# Patient Record
Sex: FEMALE
[De-identification: names, ages, dates, MRNs, and addresses within clinical notes are randomized per-mention and may not be internally consistent; named-entity substitution may affect disease eponyms.]

---

## 2019-12-02 ENCOUNTER — RX ONLY (RX ONLY)
Age: 24
End: 2019-12-02

## 2019-12-02 ENCOUNTER — ACNE/ROSACEA (OUTPATIENT)
Dept: URBAN - METROPOLITAN AREA CLINIC 32 | Facility: CLINIC | Age: 24
Setting detail: DERMATOLOGY
End: 2019-12-02

## 2019-12-02 DIAGNOSIS — D48.5 NEOPLASM OF UNCERTAIN BEHAVIOR OF SKIN: ICD-10-CM

## 2019-12-02 PROBLEM — L71.0 PERIORAL DERMATITIS: Status: RESOLVED | Noted: 2019-12-02

## 2019-12-02 PROCEDURE — 99202 OFFICE O/P NEW SF 15 MIN: CPT

## 2020-02-06 ENCOUNTER — ACNE/ROSACEA (OUTPATIENT)
Dept: URBAN - METROPOLITAN AREA CLINIC 32 | Facility: CLINIC | Age: 25
Setting detail: DERMATOLOGY
End: 2020-02-06

## 2020-02-06 ENCOUNTER — RX ONLY (RX ONLY)
Age: 25
End: 2020-02-06

## 2020-02-06 DIAGNOSIS — Z41.9 ENCOUNTER FOR PROCEDURE FOR PURPOSES OTHER THAN REMEDYING HEALTH STATE, UNSPECIFIED: ICD-10-CM

## 2020-02-06 PROCEDURE — 99213 OFFICE O/P EST LOW 20 MIN: CPT

## 2020-02-06 RX ORDER — TRETINOIN 0.25 MG/G
1 APPLICATION CREAM TOPICAL NIGHTLY
Qty: 45 | Refills: 4
Start: 2020-02-06

## 2020-02-06 RX ORDER — SPIRONOLACTONE 50 MG/1
1 TABLET TABLET, FILM COATED ORAL TID
Qty: 270 | Refills: 2
Start: 2020-02-06

## 2020-03-27 ENCOUNTER — RX ONLY (RX ONLY)
Age: 25
End: 2020-03-27

## 2020-03-27 ENCOUNTER — FOLLOW UP (OUTPATIENT)
Dept: URBAN - METROPOLITAN AREA CLINIC 32 | Facility: CLINIC | Age: 25
Setting detail: DERMATOLOGY
End: 2020-03-27

## 2020-03-27 DIAGNOSIS — L72.3 SEBACEOUS CYST: ICD-10-CM

## 2020-03-27 PROCEDURE — 99213 OFFICE O/P EST LOW 20 MIN: CPT

## 2020-03-27 RX ORDER — TRETINOIN 0.5 MG/G
1 APPLICATION CREAM TOPICAL NIGHTLY
Qty: 45 | Refills: 11
Start: 2020-03-27

## 2020-03-27 RX ORDER — DOXYCYCLINE 100 MG/1
1 TABLET TABLET, FILM COATED ORAL BID
Qty: 60 | Refills: 0 | Status: DISCONTINUED
Start: 2020-03-27 | End: 2020-03-27

## 2020-03-27 RX ORDER — SPIRONOLACTONE 100 MG/1
1 TABLET TABLET, FILM COATED ORAL BID
Qty: 60 | Refills: 3
Start: 2020-03-27

## 2020-03-27 RX ORDER — SPIRONOLACTONE 100 MG/1
1 TABLET TABLET, FILM COATED ORAL BID
Qty: 60 | Refills: 3 | Status: DISCONTINUED
Start: 2020-03-27 | End: 2020-03-27

## 2020-03-27 RX ORDER — DOXYCYCLINE HYCLATE 20 MG/1
1 TABLET TABLET, FILM COATED ORAL BID
Qty: 60 | Refills: 3
Start: 2020-03-27

## 2020-03-27 RX ORDER — DOXYCYCLINE HYCLATE 20 MG/1
1 TABLET TABLET, FILM COATED ORAL BID
Qty: 60 | Refills: 3 | Status: DISCONTINUED
Start: 2020-03-27 | End: 2020-03-27

## 2020-03-27 RX ORDER — DOXYCYCLINE 100 MG/1
1 TABLET TABLET, FILM COATED ORAL BID
Qty: 60 | Refills: 0
Start: 2020-03-27

## 2020-03-27 RX ORDER — TRETINOIN 0.5 MG/G
1 APPLICATION CREAM TOPICAL NIGHTLY
Qty: 45 | Refills: 11 | Status: DISCONTINUED
Start: 2020-03-27 | End: 2020-03-27

## 2020-05-05 ENCOUNTER — RX ONLY (RX ONLY)
Age: 25
End: 2020-05-05

## 2020-05-05 RX ORDER — TRETINOIN 1 MG/G
1 APPLICATION CREAM TOPICAL NIGHTLY
Qty: 45 | Refills: 11
Start: 2020-05-05

## 2021-01-28 ENCOUNTER — OFFICE VISIT (OUTPATIENT)
Dept: URBAN - METROPOLITAN AREA CLINIC 98 | Facility: CLINIC | Age: 26
End: 2021-01-28
Payer: COMMERCIAL

## 2021-01-28 DIAGNOSIS — R11.0 NAUSEA: ICD-10-CM

## 2021-01-28 DIAGNOSIS — R13.10 DYSPHAGIA: ICD-10-CM

## 2021-01-28 PROCEDURE — G8420 CALC BMI NORM PARAMETERS: HCPCS | Performed by: INTERNAL MEDICINE

## 2021-01-28 PROCEDURE — G8427 DOCREV CUR MEDS BY ELIG CLIN: HCPCS | Performed by: INTERNAL MEDICINE

## 2021-01-28 PROCEDURE — 1036F TOBACCO NON-USER: CPT | Performed by: INTERNAL MEDICINE

## 2021-01-28 PROCEDURE — G8483 FLU IMM NO ADMIN DOC REA: HCPCS | Performed by: INTERNAL MEDICINE

## 2021-01-28 PROCEDURE — 99214 OFFICE O/P EST MOD 30 MIN: CPT | Performed by: INTERNAL MEDICINE

## 2021-01-28 RX ORDER — ONDANSETRON HYDROCHLORIDE 4 MG/1
1 TABLET TABLET, FILM COATED ORAL
Qty: 10 | Refills: 0 | OUTPATIENT
Start: 2021-01-28

## 2021-01-28 RX ORDER — OMEPRAZOLE 20 MG/1
1 CAPSULE 30 MINUTES BEFORE MORNING MEAL CAPSULE, DELAYED RELEASE ORAL ONCE A DAY
Qty: 30 | Refills: 0 | OUTPATIENT
Start: 2021-01-28

## 2021-01-28 NOTE — EXAM-AA
Constitutional: well-developed, normal communication ability.   Eyes: Conjunctivae and eyelids appear normal, no scleral icterus. Respiratory: lungs clear to auscultation bilaterally, no rales or wheezing, no crackles   Cardiovascular: No edema, no murmurs or gallops appreciated.   Gastrointestinal: No scars, normoactive bowel sounds, soft, slight RLQ tenderness, no rebound tenderness, no shifting dullness, no organomegaly.   Musculoskeletal: normal gait and station   Skin: no jaundice   Neurologic: Oriented to person, place, time. Short term memory intact.    Psychiatric: Normal mood and appropriate affect.

## 2021-01-28 NOTE — HPI-TODAY'S VISIT:
Ms. Garibay is a 26 yo F with gastroparesis presenting for follow up. She was on reglan 5 mg BID but she had jitteriness and tremor and then she went down to reglan 5 mg PO once daily. She ended up feeling better even off the medication and she stopped it. She stopped it in June 2019. Her symptoms initially were nausea all the time. 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 She started losing weight. She had a GES which was positive. They thought that was due to post-viral causes. She also has a history of diverticulitis in March 2019 and she has not had a colonoscopy.    In the last 1 week she has started to have nausea without vomiting. She feels like something is stuck in her throat (a lump). She has no pain. She feels it intermittently and it is mostly related to eating (30-45 minutes following the meal). She feels the need to clear her throat. She could feel food in her throat 3-4 hours after eating. She also thinks she still has acid regurgitation after meals. She has tried Tums and Pepto Bismol for her symptoms. She feels they are somewhat helpful.   She has no blood in the stool, no vomiting. She has lost 3 lbs in the last 1 week.

## 2021-02-04 ENCOUNTER — OFFICE VISIT (OUTPATIENT)
Dept: URBAN - METROPOLITAN AREA CLINIC 50 | Facility: CLINIC | Age: 26
End: 2021-02-04

## 2021-02-26 ENCOUNTER — OFFICE VISIT (OUTPATIENT)
Dept: URBAN - METROPOLITAN AREA TELEHEALTH 2 | Facility: TELEHEALTH | Age: 26
End: 2021-02-26
Payer: COMMERCIAL

## 2021-02-26 VITALS — BODY MASS INDEX: 20.62 KG/M2 | HEIGHT: 60 IN | WEIGHT: 105 LBS

## 2021-02-26 DIAGNOSIS — R13.10 DYSPHAGIA: ICD-10-CM

## 2021-02-26 DIAGNOSIS — R11.0 NAUSEA: ICD-10-CM

## 2021-02-26 PROCEDURE — 99214 OFFICE O/P EST MOD 30 MIN: CPT | Performed by: INTERNAL MEDICINE

## 2021-02-26 RX ORDER — OMEPRAZOLE 20 MG/1
1 CAPSULE 30 MINUTES BEFORE MORNING MEAL CAPSULE, DELAYED RELEASE ORAL TWICE A DAY
Qty: 60 | Refills: 3 | OUTPATIENT
Start: 2021-02-26

## 2021-02-26 RX ORDER — ONDANSETRON HYDROCHLORIDE 4 MG/1
1 TABLET TABLET, FILM COATED ORAL
OUTPATIENT
Start: 2021-01-28

## 2021-02-26 RX ORDER — OMEPRAZOLE 20 MG/1
1 CAPSULE 30 MINUTES BEFORE MORNING MEAL CAPSULE, DELAYED RELEASE ORAL ONCE A DAY
Qty: 30 | Refills: 0 | Status: ACTIVE | COMMUNITY
Start: 2021-01-28

## 2021-02-26 RX ORDER — ONDANSETRON HYDROCHLORIDE 4 MG/1
1 TABLET TABLET, FILM COATED ORAL
Qty: 10 | Refills: 0 | Status: ON HOLD | COMMUNITY
Start: 2021-01-28

## 2021-02-26 RX ORDER — OMEPRAZOLE 20 MG/1
1 CAPSULE 30 MINUTES BEFORE MORNING MEAL CAPSULE, DELAYED RELEASE ORAL ONCE A DAY
OUTPATIENT
Start: 2021-01-28

## 2021-02-26 NOTE — HPI-TODAY'S VISIT:
Ms. Garibay is a 26 yo F with gastroparesis presenting for follow up. She was on omeprazole 20 mg PO BID and then started running out and went down to 20 mg once daily. She felt much better on the omeprazole. When off the omeprazole she had more nausea and lack of appetite. She had some dry heaving but did not actually vomit. Weigh tis stable between 103-107. She is avoiding alcohol as much as possible.  She has had minimal dysphagia symptoms in the last 1 month.  2021:She was on reglan 5 mg BID but she had jitteriness and tremor and then she went down to reglan 5 mg PO once daily. She ended up feeling better even off the medication and she stopped it. She stopped it in 2019. Her symptoms initially were nausea all the time. 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 She started losing weight. She had a GES which was positive. They thought that was due to post-viral causes. She also has a history of diverticulitis in 2019 and she has not had a colonoscopy.    In the last 1 week she has started to have nausea without vomiting. She feels like something is stuck in her throat (a lump). She has no pain. She feels it intermittently and it is mostly related to eating (30-45 minutes following the meal). She feels the need to clear her throat. She could feel food in her throat 3-4 hours after eating. She also thinks she still has acid regurgitation after meals. She has tried Tums and Pepto Bismol for her symptoms. She feels they are somewhat helpful.   She has no blood in the stool, no vomiting. She has lost 3 lbs in the last 1 week. Ms. Garibay is a 26 yo F with gastroparesis presenting for follow up. She was on reglan 5 mg BID but she had jitteriness and tremor and then she went down to reglan 5 mg PO once daily. She ended up feeling better even off the medication and she stopped it. She stopped it in 2019. Her symptoms initially were nausea all the time. 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 She started losing weight. She had a GES which was positive. They thought that was due to post-viral causes. She also has a history of diverticulitis in 2019 and she has not had a colonoscopy.    In the last 1 week she has started to have nausea without vomiting. She feels like something is stuck in her throat (a lump). She has no pain. She feels it intermittently and it is mostly related to eating (30-45 minutes following the meal). She feels the need to clear her throat. She could feel food in her throat 3-4 hours after eating. She also thinks she still has acid regurgitation after meals. She has tried Tums and Pepto Bismol for her symptoms. She feels they are somewhat helpful.   She has no blood in the stool, no vomiting. She has lost 3 lbs in the last 1 week.   Patient seen today via telehealth by agreement and consent of patient in light of current COVID-19 pandemic. I used video conferencing during the visit. The patient encounter is appropriate and reasonable under the circumstances given the patient's particular presentation at this time. The patient has been advised of the followin) the potential risks and limitations of this mode of treatment (including but not limited to the absence of in-person examination); 2) the right to refuse telehealth services at any point without affecting the right to future care; 3) the right to receive in-person services, included immediately after this consultation if an urgent need arises; 4) information, including identifiable images or information from this telehealth consult, will only be shared in accordance with HIPPA regulations. Any and all of the patient's and/or patient's family member's questions on this issue have been answered. The patient has verbally consented to be treated via telehealth services. The patient has also been advised to contact this office for worsening conditions or problems, and seek emergency medical treatment and/or call 911 if the patient deems either necessary.   More than half of the face-to-face time used for counseling and coordination of care.

## 2021-02-26 NOTE — EXAM-AA
Constitutional: well-developed, normal communication ability.   Eyes: Conjunctivae and eyelids appear normal, no scleral icterus.   Nose/nasopharynx, external nose: appear normal, normal appearance of lips.   Neck/thyroid: normal appearance   Chest: No visualized lesions or deformities.   Gastrointestinal: no tenderness on self palpation   Musculoskeletal: no visualized joint erythema or swelling   Skin: no rashes or jaundice   Neurologic: Oriented to person, place, time.    Psychiatric: Normal mood and appropriate affect.

## 2021-05-28 ENCOUNTER — DASHBOARD ENCOUNTERS (OUTPATIENT)
Age: 26
End: 2021-05-28

## 2021-05-28 ENCOUNTER — OFFICE VISIT (OUTPATIENT)
Dept: URBAN - METROPOLITAN AREA TELEHEALTH 2 | Facility: TELEHEALTH | Age: 26
End: 2021-05-28
Payer: COMMERCIAL

## 2021-05-28 VITALS — HEIGHT: 60 IN | BODY MASS INDEX: 21.4 KG/M2 | WEIGHT: 109 LBS | TEMPERATURE: 97.9 F

## 2021-05-28 DIAGNOSIS — R11.0 NAUSEA: ICD-10-CM

## 2021-05-28 DIAGNOSIS — R13.19 CERVICAL DYSPHAGIA: ICD-10-CM

## 2021-05-28 PROBLEM — 40739000 DYSPHAGIA: Status: ACTIVE | Noted: 2021-01-28

## 2021-05-28 PROCEDURE — 99214 OFFICE O/P EST MOD 30 MIN: CPT | Performed by: INTERNAL MEDICINE

## 2021-05-28 RX ORDER — OMEPRAZOLE 20 MG/1
1 CAPSULE 30 MINUTES BEFORE MORNING MEAL CAPSULE, DELAYED RELEASE ORAL TWICE A DAY
Qty: 60 | Refills: 3 | Status: ACTIVE | COMMUNITY
Start: 2021-02-26

## 2021-05-28 RX ORDER — OMEPRAZOLE 20 MG/1
1 CAPSULE 30 MINUTES BEFORE MORNING MEAL CAPSULE, DELAYED RELEASE ORAL TWICE A DAY
OUTPATIENT
Start: 2021-02-26

## 2021-05-28 RX ORDER — ONDANSETRON HYDROCHLORIDE 4 MG/1
1 TABLET TABLET, FILM COATED ORAL
Status: ACTIVE | COMMUNITY
Start: 2021-01-28

## 2021-05-28 RX ORDER — FAMOTIDINE 20 MG/1
1 TABLET AT BEDTIME AS NEEDED TABLET, FILM COATED ORAL ONCE A DAY
Qty: 30 | OUTPATIENT
Start: 2021-05-28

## 2021-05-28 RX ORDER — ONDANSETRON HYDROCHLORIDE 4 MG/1
1 TABLET TABLET, FILM COATED ORAL
OUTPATIENT

## 2021-05-28 NOTE — HPI-TODAY'S VISIT:
//Nausea: Improved since last visit. She is still taking omeprazole 20 mg daily only as needed (once every 2 weeks). She is not vomiting. No heartburn. She has not needed to take her Zofran.   //Dysphagia: not feling the lump in her throat at this time. She thinks this may be related to anxiety.   I personally reviewed:  2019 EGD: normal. Biopsies taken throughout 2019 EGD path: normal- some reflux changes in the esophagus  Patient seen today via telehealth by agreement and consent of patient in light of current COVID-19 pandemic. I used video conferencing during the visit. The patient encounter is appropriate and reasonable under the circumstances given the patient's particular presentation at this time. The patient has been advised of the followin) the potential risks and limitations of this mode of treatment (including but not limited to the absence of in-person examination); 2) the right to refuse telehealth services at any point without affecting the right to future care; 3) the right to receive in-person services, included immediately after this consultation if an urgent need arises; 4) information, including identifiable images or information from this telehealth consult, will only be shared in accordance with HIPPA regulations. Any and all of the patient's and/or patient's family member's questions on this issue have been answered. The patient has verbally consented to be treated via telehealth services. The patient has also been advised to contact this office for worsening conditions or problems, and seek emergency medical treatment and/or call 911 if the patient deems either necessary.   More than half of the face-to-face time used for counseling and coordination of care.

## 2021-12-03 ENCOUNTER — OFFICE VISIT (OUTPATIENT)
Dept: URBAN - METROPOLITAN AREA TELEHEALTH 2 | Facility: TELEHEALTH | Age: 26
End: 2021-12-03

## 2022-06-10 ENCOUNTER — RX ONLY (RX ONLY)
Age: 27
End: 2022-06-10

## 2022-06-10 ENCOUNTER — ACNE/ROSACEA (OUTPATIENT)
Dept: URBAN - METROPOLITAN AREA CLINIC 36 | Facility: CLINIC | Age: 27
Setting detail: DERMATOLOGY
End: 2022-06-10

## 2022-06-10 DIAGNOSIS — Z41.9 ENCOUNTER FOR PROCEDURE FOR PURPOSES OTHER THAN REMEDYING HEALTH STATE, UNSPECIFIED: ICD-10-CM

## 2022-06-10 PROCEDURE — 99214 OFFICE O/P EST MOD 30 MIN: CPT

## 2022-06-10 RX ORDER — DAPSONE 75 MG/G
GEL TOPICAL
Qty: 90 | Refills: 1
Start: 2022-06-10

## 2022-06-10 RX ORDER — DOXYCYCLINE HYCLATE 100 MG/1
1 CAPSULE CAPSULE, GELATIN COATED ORAL ONCE A DAY
Qty: 30 | Refills: 1
Start: 2022-06-10

## 2022-06-10 RX ORDER — SPIRONOLACTONE 50 MG/1
1 TABLET TABLET, FILM COATED ORAL TWICE A DAY
Qty: 60 | Refills: 1
Start: 2022-06-10